# Patient Record
Sex: MALE | Race: BLACK OR AFRICAN AMERICAN | NOT HISPANIC OR LATINO | URBAN - NONMETROPOLITAN AREA
[De-identification: names, ages, dates, MRNs, and addresses within clinical notes are randomized per-mention and may not be internally consistent; named-entity substitution may affect disease eponyms.]

---

## 2015-12-21 PROBLEM — H04.123: Noted: 2019-01-21

## 2015-12-21 PROBLEM — H25.13: Noted: 2019-01-21

## 2017-01-18 NOTE — PATIENT DISCUSSION
NTG, OU: INTRAOCULAR PRESSURE IS WITHIN ACCEPTABLE LIMITS. EARLY INFERIOR ARCUATE DEFECT NOTED OS ON BASELINE VF TODAY. CONTINUE LATANOPROST QHS OU. RETURN FOR FOLLOW-UP AS SCHEDULED.

## 2017-01-18 NOTE — PATIENT DISCUSSION
Continue: Refresh Optive (carboxymethylcellulose-glycern): drops: 0.5-0.9% 1 drop as needed into both eyes

## 2017-01-18 NOTE — PATIENT DISCUSSION
NTG, OU Counseling:  I have explained to the patient at length regarding the diagnosis of normal tension glaucoma and its pathophysiology. I have discussed the various treatment options including medications and surgery. I recommend that the patient begin medical treatment. I emphasized to the patient the importance of compliance with treatment and follow-up appointments.

## 2017-05-31 NOTE — PATIENT DISCUSSION
*NTG, OU: INTRAOCULAR PRESSURE IS WITHIN ACCEPTABLE LIMITS. PT INSTRUCTED TO CONTINUE WITH DROPS AS PRESCRIBED OU AND RETURN FOR FOLLOW-UP AS SCHEDULED.

## 2018-03-27 NOTE — PATIENT DISCUSSION
CONJUNCTIVITIS (ALLERGIC), OU:  PRESCRIBE PAZEO QAM OU FOR RELIEF OF OCULAR ITCH DURING ALLERGY SEASON. MAY DISCONTINUE ZYRTEC IF HE IS NOT GETTING ANY OTHER SYSTEMIC ALLERGY SYMPTOMS. RETURN FOR FOLLOW-UP AS SCHEDULED.

## 2018-03-27 NOTE — PATIENT DISCUSSION
NTG, OU: INTRAOCULAR PRESSURE IS WITHIN ACCEPTABLE LIMITS. CONTINUE TRAVATAN-Z QHS OU AS DIRECTED. RETURN FOR FOLLOW-UP AS SCHEDULED.

## 2018-06-04 NOTE — PATIENT DISCUSSION
ALLERGIC CONJUNCTIVITIS OU: CONTINUE  PAZEO TO USE QAM PRN FOR ITCHING AND IRRITATION. CALL IF NO IMPROVEMENT IN SYMPTOMS.

## 2018-12-10 NOTE — PATIENT DISCUSSION
HERNESTO OU:  PRESCRIBE ARTIFICIAL TEARS BID - QID. DECREASE OUTDOOR EXPOSURE AND USE UV PROTECTION/ SUNGLASSES WITH OUTDOOR ACTIVITIES. RETURN FOR FOLLOW UP AS SCHEDULED.

## 2018-12-10 NOTE — PATIENT DISCUSSION
Pinguecula Counseling:  I have explained to the patient at length the diagnosis of pinguecula and its pathophysiology. I recommended the patient adequately protect their eyes from excessive UV light and dry, rachel conditions. The use of artificial tears in dry conditions was encouraged. Return for follow-up as scheduled.

## 2019-01-21 ENCOUNTER — IMPORTED ENCOUNTER (OUTPATIENT)
Dept: URBAN - NONMETROPOLITAN AREA CLINIC 1 | Facility: CLINIC | Age: 59
End: 2019-01-21

## 2019-01-21 PROCEDURE — S0621 ROUTINE OPHTHALMOLOGICAL EXA: HCPCS

## 2019-01-21 NOTE — PATIENT DISCUSSION
Presbyopia OUDiscussed refractive status in detail with patientNew glasses Rx given todayNSC OUDiscussed diagnosis with patient. Reviewed symptoms related to cataract progression. Discussed various treatment options with patient. No treatment required at this time. Continue to monitor. SIMON OUDiscussed the diagnosis with patient. Discussed the chronic nature and treatment options with the patient. Recommend Refresh Advanced PRN samples given today. Continue to monitor.; 's Notes: MR 1/21/19DFE 1/21/19

## 2019-06-03 NOTE — PATIENT DISCUSSION
*NTG, OU: INTRAOCULAR PRESSURE IS WITHIN ACCEPTABLE LIMITS. PT INSTRUCTED TO CONTINUE WITH DROPS AS PRESCRIBED OU. EXPLAINED THE IMPORTANCE OF GOOD DROP COMPLIANCE. AND RETURN FOR FOLLOW-UP AS SCHEDULED.

## 2020-03-02 ENCOUNTER — IMPORTED ENCOUNTER (OUTPATIENT)
Dept: URBAN - NONMETROPOLITAN AREA CLINIC 1 | Facility: CLINIC | Age: 60
End: 2020-03-02

## 2020-03-02 PROCEDURE — S0621 ROUTINE OPHTHALMOLOGICAL EXA: HCPCS

## 2020-03-02 NOTE — PATIENT DISCUSSION
Presbyopia OUDiscussed refractive status in detail with patient. New glasses Rx given today. Continue to monitor. [facility] OUDiscussed diagnosis with patient. Reviewed symptoms related to cataract progression. Discussed various treatment options with patient. No treatment required at this time. Continue to monitor. SIMON OUDiscussed the diagnosis with patient. Discussed the chronic nature and treatment options with the patient. Continue Refresh Advanced PRN. Continue to monitor.; 's Notes: MR 3/2/20DFE 3/2/20

## 2020-09-16 NOTE — PATIENT DISCUSSION
New Prescription: Lumigan (bimatoprost): drops: 0.01% 1 drop at bedtime as directed into both eyes 09-

## 2020-09-16 NOTE — PATIENT DISCUSSION
*NTG, OU: INTRAOCULAR PRESSURE IS WITHIN ACCEPTABLE LIMITS. OK TO DISCONTINUE TRAVATAN Z DUE TO ITCHING, BURNING &amp; WATERING EYES WHEN USED. PRESCRIBED LUMIGAN TO BE USED OU. RETURN FOR FOLLOW-UP IN 3 WEEKS.

## 2020-10-08 NOTE — PATIENT DISCUSSION
*NTG, OU: INTRAOCULAR PRESSURE IS WITHIN ACCEPTABLE LIMITS. PT IS TOLERATING DROPS WITHOUT ANY IRRITATION. PT INSTRUCTED TO CONTINUE WITH DROPS AS PRESCRIBED OU AND RETURN FOR FOLLOW-UP AS SCHEDULED.

## 2020-10-30 NOTE — PATIENT DISCUSSION
NTG, OU: HVFS STABLE, PACHS SLIGHTLY THIN. CONTINUE LUMIGAN QHS OU. RETURN FOR FOLLOW-UP AS SCHEDULED.

## 2021-03-04 NOTE — PATIENT DISCUSSION
*NTG, OU: INTRAOCULAR PRESSURE IS WITHIN ACCEPTABLE LIMITS. PT INSTRUCTED TO CONTINUE WITH DROPS AS PRESCRIBED OU AND RETURN FOR FOLLOW-UP AS SCHEDULED. RECOMMEND USING PF AT 5 MINS AFTER PUTTING IN DROPS. USE COOL OR WARM COMPRESSES AFTER DROPS FOR IRRITATION.

## 2022-03-07 NOTE — PATIENT DISCUSSION
HVF today stable defects OU. No changes in treatment indicated at this time. Continue Lumigan qhs OU. Follow-up as directed. Monitor.

## 2022-04-09 ASSESSMENT — TONOMETRY
OS_IOP_MMHG: 16
OD_IOP_MMHG: 16
OS_IOP_MMHG: 17
OD_IOP_MMHG: 16

## 2022-04-09 ASSESSMENT — VISUAL ACUITY
OU_SC: 20/20
OS_SC: 20/20-
OS_SC: 20/20-1
OD_SC: 20/20
OU_CC: 20/25+
OD_SC: 20/25